# Patient Record
Sex: FEMALE | Race: WHITE | NOT HISPANIC OR LATINO | Employment: OTHER | URBAN - METROPOLITAN AREA
[De-identification: names, ages, dates, MRNs, and addresses within clinical notes are randomized per-mention and may not be internally consistent; named-entity substitution may affect disease eponyms.]

---

## 2017-01-05 ENCOUNTER — FOLLOW UP AND POST INJECTION EVALUATION (OUTPATIENT)
Dept: URBAN - METROPOLITAN AREA CLINIC 26 | Facility: CLINIC | Age: 72
End: 2017-01-05

## 2017-01-05 VITALS — HEIGHT: 55 IN | DIASTOLIC BLOOD PRESSURE: 80 MMHG | HEART RATE: 57 BPM | SYSTOLIC BLOOD PRESSURE: 117 MMHG

## 2017-01-05 DIAGNOSIS — H34.8310: ICD-10-CM

## 2017-01-05 DIAGNOSIS — H01.006: ICD-10-CM

## 2017-01-05 DIAGNOSIS — H01.003: ICD-10-CM

## 2017-01-05 DIAGNOSIS — H43.392: ICD-10-CM

## 2017-01-05 DIAGNOSIS — H43.821: ICD-10-CM

## 2017-01-05 DIAGNOSIS — H35.61: ICD-10-CM

## 2017-01-05 PROCEDURE — 67028 INJECTION EYE DRUG: CPT

## 2017-01-05 PROCEDURE — G8428 CUR MEDS NOT DOCUMENT: HCPCS

## 2017-01-05 PROCEDURE — 92250 FUNDUS PHOTOGRAPHY W/I&R: CPT

## 2017-01-05 PROCEDURE — 1036F TOBACCO NON-USER: CPT

## 2017-01-05 PROCEDURE — 92014 COMPRE OPH EXAM EST PT 1/>: CPT

## 2017-01-05 ASSESSMENT — TONOMETRY
OS_IOP_MMHG: 11
OD_IOP_MMHG: 09

## 2017-01-05 ASSESSMENT — VISUAL ACUITY
OS_CC: 20/20-1
OD_CC: 20/30-2

## 2017-02-27 ENCOUNTER — FOLLOW UP AND POST INJECTION EVALUATION (OUTPATIENT)
Dept: URBAN - METROPOLITAN AREA CLINIC 26 | Facility: CLINIC | Age: 72
End: 2017-02-27

## 2017-02-27 DIAGNOSIS — H02.833: ICD-10-CM

## 2017-02-27 DIAGNOSIS — H43.392: ICD-10-CM

## 2017-02-27 DIAGNOSIS — H01.003: ICD-10-CM

## 2017-02-27 DIAGNOSIS — H02.836: ICD-10-CM

## 2017-02-27 DIAGNOSIS — H34.8310: ICD-10-CM

## 2017-02-27 DIAGNOSIS — H43.821: ICD-10-CM

## 2017-02-27 DIAGNOSIS — H01.006: ICD-10-CM

## 2017-02-27 DIAGNOSIS — H04.123: ICD-10-CM

## 2017-02-27 DIAGNOSIS — H35.61: ICD-10-CM

## 2017-02-27 PROCEDURE — 92012 INTRM OPH EXAM EST PATIENT: CPT

## 2017-02-27 PROCEDURE — G8417 CALC BMI ABV UP PARAM F/U: HCPCS

## 2017-02-27 PROCEDURE — 92134 CPTRZ OPH DX IMG PST SGM RTA: CPT

## 2017-02-27 PROCEDURE — 1036F TOBACCO NON-USER: CPT

## 2017-02-27 PROCEDURE — 67028 INJECTION EYE DRUG: CPT

## 2017-02-27 ASSESSMENT — VISUAL ACUITY
OS_CC: 20/15
OD_CC: 20/40
OD_PH: 20/25

## 2017-02-27 ASSESSMENT — TONOMETRY
OS_IOP_MMHG: 12
OD_IOP_MMHG: 12

## 2017-05-01 ENCOUNTER — FOLLOW UP AND POST INJECTION EVALUATION (OUTPATIENT)
Dept: URBAN - METROPOLITAN AREA CLINIC 26 | Facility: CLINIC | Age: 72
End: 2017-05-01

## 2017-05-01 DIAGNOSIS — H01.003: ICD-10-CM

## 2017-05-01 DIAGNOSIS — H01.006: ICD-10-CM

## 2017-05-01 DIAGNOSIS — H02.833: ICD-10-CM

## 2017-05-01 DIAGNOSIS — H35.61: ICD-10-CM

## 2017-05-01 DIAGNOSIS — H04.123: ICD-10-CM

## 2017-05-01 DIAGNOSIS — H02.836: ICD-10-CM

## 2017-05-01 DIAGNOSIS — H43.821: ICD-10-CM

## 2017-05-01 DIAGNOSIS — H43.392: ICD-10-CM

## 2017-05-01 DIAGNOSIS — H34.8310: ICD-10-CM

## 2017-05-01 PROCEDURE — G8427 DOCREV CUR MEDS BY ELIG CLIN: HCPCS

## 2017-05-01 PROCEDURE — 92250 FUNDUS PHOTOGRAPHY W/I&R: CPT

## 2017-05-01 PROCEDURE — 92014 COMPRE OPH EXAM EST PT 1/>: CPT

## 2017-05-01 PROCEDURE — 1036F TOBACCO NON-USER: CPT

## 2017-05-01 PROCEDURE — 67028 INJECTION EYE DRUG: CPT

## 2017-05-01 ASSESSMENT — VISUAL ACUITY
OD_CC: 20/30
OS_CC: 20/20+3

## 2017-05-01 ASSESSMENT — TONOMETRY
OD_IOP_MMHG: 11
OS_IOP_MMHG: 12

## 2017-12-27 ENCOUNTER — FOLLOW UP (OUTPATIENT)
Dept: URBAN - METROPOLITAN AREA CLINIC 26 | Facility: CLINIC | Age: 72
End: 2017-12-27

## 2017-12-27 VITALS — HEIGHT: 55 IN | SYSTOLIC BLOOD PRESSURE: 129 MMHG | DIASTOLIC BLOOD PRESSURE: 75 MMHG | HEART RATE: 61 BPM

## 2017-12-27 DIAGNOSIS — H01.003: ICD-10-CM

## 2017-12-27 DIAGNOSIS — H34.8310: ICD-10-CM

## 2017-12-27 DIAGNOSIS — H02.833: ICD-10-CM

## 2017-12-27 DIAGNOSIS — H02.836: ICD-10-CM

## 2017-12-27 DIAGNOSIS — H43.392: ICD-10-CM

## 2017-12-27 DIAGNOSIS — H01.006: ICD-10-CM

## 2017-12-27 DIAGNOSIS — H35.61: ICD-10-CM

## 2017-12-27 DIAGNOSIS — H43.821: ICD-10-CM

## 2017-12-27 DIAGNOSIS — H04.123: ICD-10-CM

## 2017-12-27 PROCEDURE — 1036F TOBACCO NON-USER: CPT

## 2017-12-27 PROCEDURE — G8417 CALC BMI ABV UP PARAM F/U: HCPCS

## 2017-12-27 PROCEDURE — 92250 FUNDUS PHOTOGRAPHY W/I&R: CPT

## 2017-12-27 PROCEDURE — 92134 CPTRZ OPH DX IMG PST SGM RTA: CPT

## 2017-12-27 PROCEDURE — G8427 DOCREV CUR MEDS BY ELIG CLIN: HCPCS

## 2017-12-27 PROCEDURE — 92014 COMPRE OPH EXAM EST PT 1/>: CPT

## 2017-12-27 ASSESSMENT — VISUAL ACUITY
OD_CC: 20/30
OS_CC: 20/25-1

## 2017-12-27 ASSESSMENT — TONOMETRY
OS_IOP_MMHG: 10
OD_IOP_MMHG: 11

## 2018-03-12 NOTE — PROCEDURE NOTE: SURGICAL
"<p style=""text-align:justify;""><span style=""font-size:9.0pt;line-height:115%;[email or web address]:'Verdana'

## 2018-12-17 ENCOUNTER — FOLLOW UP (OUTPATIENT)
Dept: URBAN - METROPOLITAN AREA CLINIC 26 | Facility: CLINIC | Age: 73
End: 2018-12-17

## 2018-12-17 VITALS
BODY MASS INDEX: 29.88 KG/M2 | DIASTOLIC BLOOD PRESSURE: 91 MMHG | SYSTOLIC BLOOD PRESSURE: 144 MMHG | HEIGHT: 64 IN | HEART RATE: 55 BPM | WEIGHT: 175 LBS

## 2018-12-17 DIAGNOSIS — H34.8310: ICD-10-CM

## 2018-12-17 DIAGNOSIS — H43.392: ICD-10-CM

## 2018-12-17 DIAGNOSIS — H35.61: ICD-10-CM

## 2018-12-17 DIAGNOSIS — H43.821: ICD-10-CM

## 2018-12-17 PROCEDURE — 92235 FLUORESCEIN ANGRPH MLTIFRAME: CPT

## 2018-12-17 PROCEDURE — 92250 FUNDUS PHOTOGRAPHY W/I&R: CPT

## 2018-12-17 PROCEDURE — 92134 CPTRZ OPH DX IMG PST SGM RTA: CPT

## 2018-12-17 PROCEDURE — 92014 COMPRE OPH EXAM EST PT 1/>: CPT

## 2018-12-17 ASSESSMENT — VISUAL ACUITY
OS_CC: 20/15-2
OD_CC: 20/20

## 2018-12-17 ASSESSMENT — TONOMETRY
OS_IOP_MMHG: 12
OD_IOP_MMHG: 12

## 2019-12-16 ENCOUNTER — FOLLOW UP (OUTPATIENT)
Dept: URBAN - METROPOLITAN AREA CLINIC 26 | Facility: CLINIC | Age: 74
End: 2019-12-16

## 2019-12-16 VITALS — HEIGHT: 64 IN | BODY MASS INDEX: 27.49 KG/M2 | WEIGHT: 161 LBS

## 2019-12-16 DIAGNOSIS — H35.371: ICD-10-CM

## 2019-12-16 DIAGNOSIS — H34.8310: ICD-10-CM

## 2019-12-16 DIAGNOSIS — H01.006: ICD-10-CM

## 2019-12-16 DIAGNOSIS — H01.003: ICD-10-CM

## 2019-12-16 DIAGNOSIS — H02.833: ICD-10-CM

## 2019-12-16 DIAGNOSIS — H43.392: ICD-10-CM

## 2019-12-16 DIAGNOSIS — H04.123: ICD-10-CM

## 2019-12-16 DIAGNOSIS — H02.836: ICD-10-CM

## 2019-12-16 DIAGNOSIS — H43.821: ICD-10-CM

## 2019-12-16 DIAGNOSIS — H35.61: ICD-10-CM

## 2019-12-16 PROCEDURE — 92134 CPTRZ OPH DX IMG PST SGM RTA: CPT

## 2019-12-16 PROCEDURE — 92250 FUNDUS PHOTOGRAPHY W/I&R: CPT

## 2019-12-16 PROCEDURE — 92014 COMPRE OPH EXAM EST PT 1/>: CPT

## 2019-12-16 ASSESSMENT — TONOMETRY
OS_IOP_MMHG: 15
OD_IOP_MMHG: 14

## 2019-12-16 ASSESSMENT — VISUAL ACUITY
OS_CC: 20/20
OD_CC: 20/20+2

## 2020-12-16 ENCOUNTER — FOLLOW UP (OUTPATIENT)
Dept: URBAN - METROPOLITAN AREA CLINIC 26 | Facility: CLINIC | Age: 75
End: 2020-12-16

## 2020-12-16 VITALS — WEIGHT: 150 LBS | HEIGHT: 64 IN | BODY MASS INDEX: 25.61 KG/M2

## 2020-12-16 DIAGNOSIS — H02.836: ICD-10-CM

## 2020-12-16 DIAGNOSIS — H35.371: ICD-10-CM

## 2020-12-16 DIAGNOSIS — H43.811: ICD-10-CM

## 2020-12-16 DIAGNOSIS — H35.61: ICD-10-CM

## 2020-12-16 DIAGNOSIS — H04.123: ICD-10-CM

## 2020-12-16 DIAGNOSIS — H01.003: ICD-10-CM

## 2020-12-16 DIAGNOSIS — H43.821: ICD-10-CM

## 2020-12-16 DIAGNOSIS — H34.8310: ICD-10-CM

## 2020-12-16 DIAGNOSIS — H43.392: ICD-10-CM

## 2020-12-16 DIAGNOSIS — H01.006: ICD-10-CM

## 2020-12-16 DIAGNOSIS — H02.833: ICD-10-CM

## 2020-12-16 PROCEDURE — 92014 COMPRE OPH EXAM EST PT 1/>: CPT

## 2020-12-16 PROCEDURE — 92250 FUNDUS PHOTOGRAPHY W/I&R: CPT

## 2020-12-16 PROCEDURE — 92134 CPTRZ OPH DX IMG PST SGM RTA: CPT

## 2020-12-16 ASSESSMENT — VISUAL ACUITY
OS_CC: 20/25
OD_CC: 20/30-2

## 2020-12-16 ASSESSMENT — TONOMETRY
OS_IOP_MMHG: 16
OD_IOP_MMHG: 15

## 2022-07-30 ENCOUNTER — TELEPHONE ENCOUNTER (OUTPATIENT)
Age: 77
End: 2022-07-30

## 2022-07-31 ENCOUNTER — TELEPHONE ENCOUNTER (OUTPATIENT)
Age: 77
End: 2022-07-31